# Patient Record
Sex: FEMALE | Race: WHITE | Employment: STUDENT | URBAN - METROPOLITAN AREA
[De-identification: names, ages, dates, MRNs, and addresses within clinical notes are randomized per-mention and may not be internally consistent; named-entity substitution may affect disease eponyms.]

---

## 2017-06-09 ENCOUNTER — HOSPITAL ENCOUNTER (OUTPATIENT)
Dept: GENERAL RADIOLOGY | Facility: HOSPITAL | Age: 22
Discharge: HOME | End: 2017-06-09
Attending: PEDIATRICS
Payer: COMMERCIAL

## 2017-06-09 ENCOUNTER — EKG ENCOUNTER (OUTPATIENT)
Dept: LAB | Facility: HOSPITAL | Age: 22
End: 2017-06-09
Attending: PEDIATRICS
Payer: COMMERCIAL

## 2017-06-09 ENCOUNTER — LAB ENCOUNTER (OUTPATIENT)
Dept: LAB | Facility: HOSPITAL | Age: 22
End: 2017-06-09
Attending: PEDIATRICS
Payer: COMMERCIAL

## 2017-06-09 ENCOUNTER — HOSPITAL ENCOUNTER (EMERGENCY)
Facility: HOSPITAL | Age: 22
Discharge: HOME | End: 2017-06-09
Attending: EMERGENCY MEDICINE
Payer: COMMERCIAL

## 2017-06-09 ENCOUNTER — PRIOR ORIGINAL RECORDS (OUTPATIENT)
Dept: OTHER | Age: 22
End: 2017-06-09

## 2017-06-09 VITALS
OXYGEN SATURATION: 99 % | TEMPERATURE: 99 F | SYSTOLIC BLOOD PRESSURE: 135 MMHG | DIASTOLIC BLOOD PRESSURE: 80 MMHG | RESPIRATION RATE: 18 BRPM | HEART RATE: 95 BPM

## 2017-06-09 DIAGNOSIS — R06.00 DYSPNEA: ICD-10-CM

## 2017-06-09 DIAGNOSIS — R00.2 PALPITATIONS: Primary | ICD-10-CM

## 2017-06-09 DIAGNOSIS — R07.89 CHEST PAIN, ATYPICAL: Primary | ICD-10-CM

## 2017-06-09 DIAGNOSIS — R00.2 PALPITATION: Primary | ICD-10-CM

## 2017-06-09 PROCEDURE — 80053 COMPREHEN METABOLIC PANEL: CPT

## 2017-06-09 PROCEDURE — 84439 ASSAY OF FREE THYROXINE: CPT

## 2017-06-09 PROCEDURE — 84443 ASSAY THYROID STIM HORMONE: CPT

## 2017-06-09 PROCEDURE — 36415 COLL VENOUS BLD VENIPUNCTURE: CPT

## 2017-06-09 PROCEDURE — 93010 ELECTROCARDIOGRAM REPORT: CPT | Performed by: INTERNAL MEDICINE

## 2017-06-09 PROCEDURE — 85025 COMPLETE CBC W/AUTO DIFF WBC: CPT

## 2017-06-09 PROCEDURE — 93005 ELECTROCARDIOGRAM TRACING: CPT

## 2017-06-09 PROCEDURE — 71020 XR CHEST PA + LAT CHEST (CPT=71020): CPT | Performed by: PEDIATRICS

## 2017-06-09 PROCEDURE — 85378 FIBRIN DEGRADE SEMIQUANT: CPT

## 2017-06-09 PROCEDURE — 99284 EMERGENCY DEPT VISIT MOD MDM: CPT

## 2017-06-09 PROCEDURE — 93010 ELECTROCARDIOGRAM REPORT: CPT

## 2017-06-09 PROCEDURE — 99285 EMERGENCY DEPT VISIT HI MDM: CPT

## 2017-06-09 PROCEDURE — 84484 ASSAY OF TROPONIN QUANT: CPT | Performed by: EMERGENCY MEDICINE

## 2017-06-09 NOTE — ED INITIAL ASSESSMENT (HPI)
Pt was seen today at outpatient clinic today for asthma evaluation and told staff she felt like she was having palpations. Pt reports palpations 3 or 4 times this week. Denies any CP or SOB. Denies cardiac hx.  Was sent here for repeat ekg by MD. Antony bowden

## 2017-06-09 NOTE — ED NOTES
Assuming care of pt at this time. Pt saw her PMD today who sent her to the hospital for outpatient labs. Was then informed by MD to come to ER for an EKG. Pt sts she was feeling short of breath but also suffers from anxiety.  Pt was given an inhaler but den

## 2017-06-10 NOTE — ED PROVIDER NOTES
Patient Seen in: [facility] Emergency Department    History   Patient presents with:  Arrythmia/Palpitations (cardiovascular)    Stated Complaint: palpitations    HPI    Patient is a 62-year-old female presents the emergency room with chest pain pal mucous membranes. No meningismus. No adenopathy  Lungs: No tachypnea. Lungs clear to auscultation bilaterally without rales/rhonchi. Equal breath sounds bilaterally  Cardiac: No tachycardia. No murmurs. Regular rate and rhythm.   Abdomen: Soft and non

## 2017-06-14 ENCOUNTER — PRIOR ORIGINAL RECORDS (OUTPATIENT)
Dept: OTHER | Age: 22
End: 2017-06-14

## 2017-06-14 ENCOUNTER — MYAURORA ACCOUNT LINK (OUTPATIENT)
Dept: OTHER | Age: 22
End: 2017-06-14

## 2017-06-19 LAB
ALBUMIN: 4 G/DL
ALKALINE PHOSPHATATE(ALK PHOS): 73 IU/L
BILIRUBIN TOTAL: 0.4 MG/DL
BUN: 11 MG/DL
CALCIUM: 9.1 MG/DL
CHLORIDE: 109 MEQ/L
CREATININE, SERUM: 0.71 MG/DL
FREE T4: 1.2 MG/DL
GLUCOSE: 96 MG/DL
HEMATOCRIT: 39.6 %
HEMOGLOBIN: 13.5 G/DL
PLATELETS: 332 K/UL
POTASSIUM, SERUM: 4 MEQ/L
PROTEIN, TOTAL: 7.8 G/DL
RED BLOOD COUNT: 4.39 X 10-6/U
SGOT (AST): 13 IU/L
SGPT (ALT): 19 IU/L
SODIUM: 142 MEQ/L
THYROID STIMULATING HORMONE: 0.92 MLU/L
WHITE BLOOD COUNT: 11.4 X 10-3/U

## 2017-06-30 ENCOUNTER — HOSPITAL ENCOUNTER (OUTPATIENT)
Dept: CV DIAGNOSTICS | Age: 22
Discharge: HOME | End: 2017-06-30
Attending: INTERNAL MEDICINE
Payer: COMMERCIAL

## 2017-06-30 ENCOUNTER — MYAURORA ACCOUNT LINK (OUTPATIENT)
Dept: OTHER | Age: 22
End: 2017-06-30

## 2017-06-30 DIAGNOSIS — R06.00 DYSPNEA: ICD-10-CM

## 2017-06-30 DIAGNOSIS — R07.89 OTHER CHEST PAIN: ICD-10-CM

## 2017-06-30 DIAGNOSIS — R94.31 ABNORMAL EKG: ICD-10-CM

## 2017-06-30 DIAGNOSIS — R06.00 DYSPNEA, PAROXYSMAL NOCTURNAL: ICD-10-CM

## 2017-06-30 DIAGNOSIS — R94.31 NONSPECIFIC ABNORMAL ELECTROCARDIOGRAM (ECG) (EKG): ICD-10-CM

## 2017-06-30 DIAGNOSIS — R07.9 CHEST PAIN: ICD-10-CM

## 2017-06-30 PROCEDURE — 93017 CV STRESS TEST TRACING ONLY: CPT | Performed by: INTERNAL MEDICINE

## 2017-06-30 PROCEDURE — 93018 CV STRESS TEST I&R ONLY: CPT | Performed by: INTERNAL MEDICINE

## 2019-01-10 ENCOUNTER — OFFICE VISIT (OUTPATIENT)
Dept: FAMILY MEDICINE CLINIC | Facility: CLINIC | Age: 24
End: 2019-01-10
Payer: COMMERCIAL

## 2019-01-10 VITALS
TEMPERATURE: 99 F | HEIGHT: 61.75 IN | DIASTOLIC BLOOD PRESSURE: 70 MMHG | SYSTOLIC BLOOD PRESSURE: 105 MMHG | WEIGHT: 105 LBS | BODY MASS INDEX: 19.32 KG/M2 | HEART RATE: 79 BPM | RESPIRATION RATE: 16 BRPM | OXYGEN SATURATION: 98 %

## 2019-01-10 DIAGNOSIS — J06.9 VIRAL URI WITH COUGH: Primary | ICD-10-CM

## 2019-01-10 PROCEDURE — 99213 OFFICE O/P EST LOW 20 MIN: CPT | Performed by: NURSE PRACTITIONER

## 2019-01-10 RX ORDER — BENZONATATE 200 MG/1
200 CAPSULE ORAL 3 TIMES DAILY PRN
Qty: 30 CAPSULE | Refills: 0 | Status: SHIPPED | OUTPATIENT
Start: 2019-01-10 | End: 2019-01-20

## 2019-01-10 NOTE — PROGRESS NOTES
HPI:   Mike Harrell is a 26year old female who presents with ill symptoms for  6  days. Patient reports sore throat, congestion, cough, mild ear pain, swollen glands. Last night awoke with right eye crusting and dryness.  Has tried Sudafed x 1-2 days an noted at lashes. PERRLA and EOMI,  Uvuvla midline. NO sinus tenderness with palpation.   NECK: supple,positive anterior/posterior cervical adenopathy  LUNGS: clear to auscultation all lobes  CARDIO: RRR without murmur      ASSESSMENT AND PLAN:    PLAN:Tayl towels or drinks with others. · Vitamin C drops, Zinc lozenges (such as Cold-eeze), Cepacol lozenges or other throat drops may help soothe as well during the day. · No work or school until fever free for 24 hours.   · Follow up in 10-14 days after illness

## 2019-01-10 NOTE — PATIENT INSTRUCTIONS
Self care for viral illnesses:  Benzonatate perles every eight hours with large glass of water for cough and to help numb throat as needed. May make you drowsy. If not helping by the third dose you may stop this medication.    May take Tylenol or Ibuprofen

## 2019-03-01 VITALS
HEIGHT: 62 IN | SYSTOLIC BLOOD PRESSURE: 98 MMHG | WEIGHT: 98 LBS | DIASTOLIC BLOOD PRESSURE: 70 MMHG | HEART RATE: 88 BPM | BODY MASS INDEX: 18.03 KG/M2

## 2021-09-01 ENCOUNTER — OFFICE VISIT (OUTPATIENT)
Dept: FAMILY MEDICINE CLINIC | Facility: CLINIC | Age: 26
End: 2021-09-01
Payer: COMMERCIAL

## 2021-09-01 VITALS
RESPIRATION RATE: 16 BRPM | HEIGHT: 62 IN | SYSTOLIC BLOOD PRESSURE: 120 MMHG | BODY MASS INDEX: 19.32 KG/M2 | WEIGHT: 105 LBS | TEMPERATURE: 99 F | DIASTOLIC BLOOD PRESSURE: 70 MMHG | OXYGEN SATURATION: 98 % | HEART RATE: 76 BPM

## 2021-09-01 DIAGNOSIS — Z20.822 EXPOSURE TO CONFIRMED CASE OF COVID-19: Primary | ICD-10-CM

## 2021-09-01 PROCEDURE — 3078F DIAST BP <80 MM HG: CPT | Performed by: NURSE PRACTITIONER

## 2021-09-01 PROCEDURE — 3008F BODY MASS INDEX DOCD: CPT | Performed by: NURSE PRACTITIONER

## 2021-09-01 PROCEDURE — 99213 OFFICE O/P EST LOW 20 MIN: CPT | Performed by: NURSE PRACTITIONER

## 2021-09-01 PROCEDURE — 3074F SYST BP LT 130 MM HG: CPT | Performed by: NURSE PRACTITIONER

## 2021-09-01 NOTE — PROGRESS NOTES
CHIEF COMPLAINT:   Patient presents with:  Covid-19 Test: exposure 1 week ago. HPI:   Mike Harrell is a 23year old female who presents for Covid 19 exposure 7 days ago, wore mask and only took mask off to eat while at work where exposure was at. NECK: Supple, non-tender  LUNGS: clear to auscultation bilaterally; good air movement. Breathing is non labored.   CARDIO: RRR without murmur  GI: active BS's x4,no masses, hepatosplenomegaly, or tenderness on direct palpation  LYMPH:  No cervical lympha

## 2021-09-03 LAB — SARS-COV-2 RNA RESP QL NAA+PROBE: NOT DETECTED

## 2023-05-28 ENCOUNTER — OFFICE VISIT (OUTPATIENT)
Dept: FAMILY MEDICINE CLINIC | Facility: CLINIC | Age: 28
End: 2023-05-28
Payer: COMMERCIAL

## 2023-05-28 VITALS
BODY MASS INDEX: 23 KG/M2 | TEMPERATURE: 98 F | HEIGHT: 62 IN | HEART RATE: 84 BPM | WEIGHT: 125 LBS | SYSTOLIC BLOOD PRESSURE: 112 MMHG | RESPIRATION RATE: 16 BRPM | OXYGEN SATURATION: 99 % | DIASTOLIC BLOOD PRESSURE: 86 MMHG

## 2023-05-28 DIAGNOSIS — H69.82 EUSTACHIAN TUBE DYSFUNCTION, LEFT: Primary | ICD-10-CM

## 2023-05-28 RX ORDER — ESCITALOPRAM OXALATE 10 MG/1
10 TABLET ORAL DAILY
COMMUNITY
Start: 2022-12-02

## (undated) NOTE — ED AVS SNAPSHOT
[facility] Emergency Department    [place]  [address] [address]    Phone:  [phone]    Fax:  [phone] [facility]   MRN: [id number]    Department:  [facility] Emergency Department   Date of Visit:  6/9/ IF THERE IS ANY CHANGE OR WORSENING OF YOUR CONDITION, CALL YOUR PRIMARY CARE PHYSICIAN AT ONCE OR RETURN IMMEDIATELY TO THE EMERGENCY DEPARTMENT.     If you have been prescribed any medication(s), please fill your prescription right away and begin taking t

## (undated) NOTE — ED AVS SNAPSHOT
[facility] Emergency Department    [place]  [address] [address]    Phone:  [phone]    Fax:  [phone] [facility]   MRN: [id number]    Department:  [facility] Emergency Department   Date of Visit:  6/9/ at [phone]    Si usted tiene algun problema con castellanos sequimiento, por favor llame a nuestro adminstrador de casos al [phone]    Expect to receive an electronic request (by e-mail or text) to complete a self-assessment the day after your visit. [place] [facility] [address] ([address] [facility]) [address] [facility]. ([address] [facility]) [address]  ([address]) [address] [facility] your Zip Code and Date of Birth to complete the sign-up process. If you do not sign up before the expiration date, you must request a new code.     Your unique [employer] Access Code: [email or web address]  Expires: 8/8/2017  7:21 PM    If you have questions, you can ca